# Patient Record
Sex: FEMALE | Race: NATIVE HAWAIIAN OR OTHER PACIFIC ISLANDER
[De-identification: names, ages, dates, MRNs, and addresses within clinical notes are randomized per-mention and may not be internally consistent; named-entity substitution may affect disease eponyms.]

---

## 2017-01-09 ENCOUNTER — HOSPITAL ENCOUNTER (OUTPATIENT)
Dept: HOSPITAL 67 - LAB | Age: 72
Discharge: HOME | End: 2017-01-09
Attending: INTERNAL MEDICINE
Payer: COMMERCIAL

## 2017-01-09 DIAGNOSIS — E11.9: ICD-10-CM

## 2017-01-09 DIAGNOSIS — D53.8: ICD-10-CM

## 2017-01-09 DIAGNOSIS — I10: ICD-10-CM

## 2017-01-09 DIAGNOSIS — E03.8: ICD-10-CM

## 2017-01-09 DIAGNOSIS — R82.99: ICD-10-CM

## 2017-01-09 DIAGNOSIS — Z00.01: Primary | ICD-10-CM

## 2017-01-09 LAB
LDLC SERPL-MCNC: 101 MG/DL (ref 0–?)
PLATELET # BLD: 364 K/UL (ref 152–353)
POTASSIUM SERPL-SCNC: 4.3 MMOL/L (ref 3.6–5.2)
SODIUM SERPL-SCNC: 137 MMOL/L (ref 136–145)

## 2017-01-09 PROCEDURE — 83540 ASSAY OF IRON: CPT

## 2017-01-09 PROCEDURE — 80061 LIPID PANEL: CPT

## 2017-01-09 PROCEDURE — 83036 HEMOGLOBIN GLYCOSYLATED A1C: CPT

## 2017-01-09 PROCEDURE — 84443 ASSAY THYROID STIM HORMONE: CPT

## 2017-01-09 PROCEDURE — 82728 ASSAY OF FERRITIN: CPT

## 2017-01-09 PROCEDURE — 87086 URINE CULTURE/COLONY COUNT: CPT

## 2017-01-09 PROCEDURE — 84550 ASSAY OF BLOOD/URIC ACID: CPT

## 2017-01-09 PROCEDURE — 87077 CULTURE AEROBIC IDENTIFY: CPT

## 2017-01-09 PROCEDURE — 87088 URINE BACTERIA CULTURE: CPT

## 2017-01-09 PROCEDURE — 85651 RBC SED RATE NONAUTOMATED: CPT

## 2017-01-09 PROCEDURE — 80053 COMPREHEN METABOLIC PANEL: CPT

## 2017-01-09 PROCEDURE — 85027 COMPLETE CBC AUTOMATED: CPT

## 2017-01-09 PROCEDURE — 83550 IRON BINDING TEST: CPT

## 2017-01-09 PROCEDURE — 81000 URINALYSIS NONAUTO W/SCOPE: CPT

## 2017-01-09 PROCEDURE — 87186 SC STD MICRODIL/AGAR DIL: CPT

## 2017-01-09 PROCEDURE — 85044 MANUAL RETICULOCYTE COUNT: CPT

## 2017-01-12 ENCOUNTER — HOSPITAL ENCOUNTER (OUTPATIENT)
Dept: HOSPITAL 67 - INF | Age: 72
Discharge: HOME | End: 2017-01-12
Attending: INTERNAL MEDICINE
Payer: COMMERCIAL

## 2017-01-12 VITALS — SYSTOLIC BLOOD PRESSURE: 186 MMHG | TEMPERATURE: 98.6 F | DIASTOLIC BLOOD PRESSURE: 83 MMHG

## 2017-01-12 VITALS — BODY MASS INDEX: 0.51 KG/M2 | WEIGHT: 2.2 LBS | HEIGHT: 55 IN

## 2017-01-12 VITALS — DIASTOLIC BLOOD PRESSURE: 78 MMHG | TEMPERATURE: 98.8 F | SYSTOLIC BLOOD PRESSURE: 185 MMHG

## 2017-01-12 DIAGNOSIS — D50.8: Primary | ICD-10-CM

## 2017-01-12 PROCEDURE — 96365 THER/PROPH/DIAG IV INF INIT: CPT

## 2017-03-07 ENCOUNTER — HOSPITAL ENCOUNTER (OUTPATIENT)
Dept: HOSPITAL 67 - LABW | Age: 72
Discharge: HOME | End: 2017-03-07
Payer: COMMERCIAL

## 2017-03-07 DIAGNOSIS — M79.671: ICD-10-CM

## 2017-03-07 DIAGNOSIS — E11.9: Primary | ICD-10-CM

## 2017-03-07 DIAGNOSIS — I10: ICD-10-CM

## 2017-03-07 LAB — PLATELET # BLD: 264 K/UL (ref 152–353)

## 2017-03-07 PROCEDURE — 85027 COMPLETE CBC AUTOMATED: CPT

## 2017-03-07 PROCEDURE — 85651 RBC SED RATE NONAUTOMATED: CPT

## 2017-03-07 PROCEDURE — 36415 COLL VENOUS BLD VENIPUNCTURE: CPT

## 2017-04-10 ENCOUNTER — HOSPITAL ENCOUNTER (OUTPATIENT)
Dept: HOSPITAL 67 - LAB | Age: 72
Discharge: HOME | End: 2017-04-10
Attending: INTERNAL MEDICINE
Payer: COMMERCIAL

## 2017-04-10 DIAGNOSIS — Z51.81: ICD-10-CM

## 2017-04-10 DIAGNOSIS — Z79.2: Primary | ICD-10-CM

## 2017-04-10 DIAGNOSIS — L03.115: ICD-10-CM

## 2017-04-10 LAB
PLATELET # BLD: 246 K/UL (ref 152–353)
POTASSIUM SERPL-SCNC: 4.1 MMOL/L (ref 3.6–5.2)
SODIUM SERPL-SCNC: 140 MMOL/L (ref 136–145)

## 2017-04-10 PROCEDURE — 85027 COMPLETE CBC AUTOMATED: CPT

## 2017-04-10 PROCEDURE — 86140 C-REACTIVE PROTEIN: CPT

## 2017-04-10 PROCEDURE — 80048 BASIC METABOLIC PNL TOTAL CA: CPT

## 2017-04-17 ENCOUNTER — HOSPITAL ENCOUNTER (OUTPATIENT)
Dept: HOSPITAL 67 - LAB | Age: 72
Discharge: HOME | End: 2017-04-17
Attending: INTERNAL MEDICINE
Payer: COMMERCIAL

## 2017-04-17 DIAGNOSIS — M86.171: Primary | ICD-10-CM

## 2017-04-17 DIAGNOSIS — Z45.2: ICD-10-CM

## 2017-04-17 DIAGNOSIS — Z79.2: ICD-10-CM

## 2017-04-17 DIAGNOSIS — L03.115: ICD-10-CM

## 2017-04-17 LAB
PLATELET # BLD: 262 K/UL (ref 152–353)
POTASSIUM SERPL-SCNC: 4.7 MMOL/L (ref 3.6–5.2)
SODIUM SERPL-SCNC: 138 MMOL/L (ref 136–145)

## 2017-04-17 PROCEDURE — 86140 C-REACTIVE PROTEIN: CPT

## 2017-04-17 PROCEDURE — 85027 COMPLETE CBC AUTOMATED: CPT

## 2017-04-17 PROCEDURE — 80048 BASIC METABOLIC PNL TOTAL CA: CPT

## 2017-04-24 ENCOUNTER — HOSPITAL ENCOUNTER (OUTPATIENT)
Dept: HOSPITAL 67 - LAB | Age: 72
Discharge: HOME | End: 2017-04-24
Attending: INTERNAL MEDICINE
Payer: COMMERCIAL

## 2017-04-24 DIAGNOSIS — M86.171: Primary | ICD-10-CM

## 2017-04-24 DIAGNOSIS — L03.115: ICD-10-CM

## 2017-04-24 LAB
PLATELET # BLD: 249 K/UL (ref 152–353)
POTASSIUM SERPL-SCNC: 4.1 MMOL/L (ref 3.6–5.2)
SODIUM SERPL-SCNC: 138 MMOL/L (ref 136–145)

## 2017-04-24 PROCEDURE — 85027 COMPLETE CBC AUTOMATED: CPT

## 2017-04-24 PROCEDURE — 80048 BASIC METABOLIC PNL TOTAL CA: CPT

## 2017-04-24 PROCEDURE — 86140 C-REACTIVE PROTEIN: CPT

## 2017-05-01 ENCOUNTER — HOSPITAL ENCOUNTER (OUTPATIENT)
Dept: HOSPITAL 67 - LAB | Age: 72
Discharge: HOME | End: 2017-05-01
Attending: INTERNAL MEDICINE
Payer: COMMERCIAL

## 2017-05-01 DIAGNOSIS — Z79.2: ICD-10-CM

## 2017-05-01 DIAGNOSIS — L03.115: Primary | ICD-10-CM

## 2017-05-01 DIAGNOSIS — Z51.81: ICD-10-CM

## 2017-05-01 LAB
PLATELET # BLD: 233 K/UL (ref 152–353)
POTASSIUM SERPL-SCNC: 3.9 MMOL/L (ref 3.6–5.2)
SODIUM SERPL-SCNC: 141 MMOL/L (ref 136–145)

## 2017-05-01 PROCEDURE — 85027 COMPLETE CBC AUTOMATED: CPT

## 2017-05-01 PROCEDURE — 80048 BASIC METABOLIC PNL TOTAL CA: CPT

## 2017-05-01 PROCEDURE — 86140 C-REACTIVE PROTEIN: CPT

## 2017-05-08 ENCOUNTER — HOSPITAL ENCOUNTER (OUTPATIENT)
Dept: HOSPITAL 67 - LAB | Age: 72
Discharge: HOME | End: 2017-05-08
Attending: INTERNAL MEDICINE
Payer: COMMERCIAL

## 2017-05-08 DIAGNOSIS — G57.91: ICD-10-CM

## 2017-05-08 DIAGNOSIS — Z45.2: ICD-10-CM

## 2017-05-08 DIAGNOSIS — E11.65: ICD-10-CM

## 2017-05-08 DIAGNOSIS — Z79.2: ICD-10-CM

## 2017-05-08 DIAGNOSIS — L03.115: ICD-10-CM

## 2017-05-08 DIAGNOSIS — M86.171: Primary | ICD-10-CM

## 2017-05-08 DIAGNOSIS — I10: ICD-10-CM

## 2017-05-08 LAB
PLATELET # BLD: 260 K/UL (ref 152–353)
POTASSIUM SERPL-SCNC: 4 MMOL/L (ref 3.6–5.2)
SODIUM SERPL-SCNC: 140 MMOL/L (ref 136–145)

## 2017-05-08 PROCEDURE — 86140 C-REACTIVE PROTEIN: CPT

## 2017-05-08 PROCEDURE — 80048 BASIC METABOLIC PNL TOTAL CA: CPT

## 2017-05-08 PROCEDURE — 85027 COMPLETE CBC AUTOMATED: CPT

## 2017-06-15 ENCOUNTER — HOSPITAL ENCOUNTER (OUTPATIENT)
Dept: HOSPITAL 67 - LAB | Age: 72
Discharge: HOME | End: 2017-06-15
Attending: INTERNAL MEDICINE
Payer: COMMERCIAL

## 2017-06-15 DIAGNOSIS — M86.171: Primary | ICD-10-CM

## 2017-06-15 LAB
PLATELET # BLD: 248 K/UL (ref 152–353)
POTASSIUM SERPL-SCNC: 4.4 MMOL/L (ref 3.6–5.2)
SODIUM SERPL-SCNC: 141 MMOL/L (ref 136–145)

## 2017-06-15 PROCEDURE — 86140 C-REACTIVE PROTEIN: CPT

## 2017-06-15 PROCEDURE — 80048 BASIC METABOLIC PNL TOTAL CA: CPT

## 2017-06-15 PROCEDURE — 85027 COMPLETE CBC AUTOMATED: CPT

## 2017-10-23 ENCOUNTER — HOSPITAL ENCOUNTER (OUTPATIENT)
Dept: HOSPITAL 67 - LAB | Age: 72
Discharge: HOME | End: 2017-10-23
Attending: NURSE PRACTITIONER
Payer: COMMERCIAL

## 2017-10-23 DIAGNOSIS — I10: ICD-10-CM

## 2017-10-23 DIAGNOSIS — E78.4: ICD-10-CM

## 2017-10-23 DIAGNOSIS — D63.8: ICD-10-CM

## 2017-10-23 DIAGNOSIS — E11.65: Primary | ICD-10-CM

## 2017-10-23 DIAGNOSIS — E55.9: ICD-10-CM

## 2017-10-23 LAB
LDLC SERPL-MCNC: 108 MG/DL (ref 0–?)
PLATELET # BLD: 235 K/UL (ref 152–353)
POTASSIUM SERPL-SCNC: 5.1 MMOL/L (ref 3.6–5.2)
SODIUM SERPL-SCNC: 139 MMOL/L (ref 136–145)

## 2017-10-23 PROCEDURE — 82306 VITAMIN D 25 HYDROXY: CPT

## 2017-10-23 PROCEDURE — 85027 COMPLETE CBC AUTOMATED: CPT

## 2017-10-23 PROCEDURE — 82570 ASSAY OF URINE CREATININE: CPT

## 2017-10-23 PROCEDURE — 80061 LIPID PANEL: CPT

## 2017-10-23 PROCEDURE — 83036 HEMOGLOBIN GLYCOSYLATED A1C: CPT

## 2017-10-23 PROCEDURE — 82043 UR ALBUMIN QUANTITATIVE: CPT

## 2017-10-23 PROCEDURE — 80053 COMPREHEN METABOLIC PANEL: CPT

## 2017-10-23 PROCEDURE — 86039 ANTINUCLEAR ANTIBODIES (ANA): CPT

## 2017-10-23 PROCEDURE — 85651 RBC SED RATE NONAUTOMATED: CPT

## 2018-05-01 ENCOUNTER — HOSPITAL ENCOUNTER (OUTPATIENT)
Dept: HOSPITAL 67 - LABW | Age: 73
Discharge: HOME | End: 2018-05-01
Attending: NURSE PRACTITIONER
Payer: COMMERCIAL

## 2018-05-01 DIAGNOSIS — E11.65: ICD-10-CM

## 2018-05-01 DIAGNOSIS — D63.8: ICD-10-CM

## 2018-05-01 DIAGNOSIS — I10: ICD-10-CM

## 2018-05-01 DIAGNOSIS — E03.8: ICD-10-CM

## 2018-05-01 DIAGNOSIS — E78.4: ICD-10-CM

## 2018-05-01 DIAGNOSIS — R60.0: Primary | ICD-10-CM

## 2018-05-01 DIAGNOSIS — E55.9: ICD-10-CM

## 2018-05-01 PROCEDURE — 83036 HEMOGLOBIN GLYCOSYLATED A1C: CPT

## 2018-05-01 PROCEDURE — 80053 COMPREHEN METABOLIC PANEL: CPT

## 2018-05-01 PROCEDURE — 82043 UR ALBUMIN QUANTITATIVE: CPT

## 2018-05-01 PROCEDURE — 83735 ASSAY OF MAGNESIUM: CPT

## 2018-05-01 PROCEDURE — 82570 ASSAY OF URINE CREATININE: CPT

## 2018-05-01 PROCEDURE — 80061 LIPID PANEL: CPT

## 2018-05-01 PROCEDURE — 84443 ASSAY THYROID STIM HORMONE: CPT

## 2018-05-01 PROCEDURE — 85027 COMPLETE CBC AUTOMATED: CPT

## 2018-05-01 PROCEDURE — 82306 VITAMIN D 25 HYDROXY: CPT

## 2018-05-02 LAB
LDLC SERPL-MCNC: 74 MG/DL (ref 0–?)
PLATELET # BLD: 281 K/UL (ref 152–353)
POTASSIUM SERPL-SCNC: 5.4 MMOL/L (ref 3.6–5.2)
SODIUM SERPL-SCNC: 137 MMOL/L (ref 136–145)

## 2018-08-14 ENCOUNTER — HOSPITAL ENCOUNTER (OUTPATIENT)
Dept: HOSPITAL 67 - LAB | Age: 73
Discharge: HOME | End: 2018-08-14
Payer: COMMERCIAL

## 2018-08-14 DIAGNOSIS — M05.50: ICD-10-CM

## 2018-08-14 DIAGNOSIS — E78.4: ICD-10-CM

## 2018-08-14 DIAGNOSIS — E11.9: ICD-10-CM

## 2018-08-14 DIAGNOSIS — I10: Primary | ICD-10-CM

## 2018-08-14 LAB
LDLC SERPL-MCNC: 112 MG/DL (ref 0–?)
PLATELET # BLD: 234 K/UL (ref 152–353)
POTASSIUM SERPL-SCNC: 5.6 MMOL/L (ref 3.6–5.2)
SODIUM SERPL-SCNC: 141 MMOL/L (ref 136–145)

## 2018-08-14 PROCEDURE — 83735 ASSAY OF MAGNESIUM: CPT

## 2018-08-14 PROCEDURE — 85027 COMPLETE CBC AUTOMATED: CPT

## 2018-08-14 PROCEDURE — 82306 VITAMIN D 25 HYDROXY: CPT

## 2018-08-14 PROCEDURE — 82043 UR ALBUMIN QUANTITATIVE: CPT

## 2018-08-14 PROCEDURE — 83036 HEMOGLOBIN GLYCOSYLATED A1C: CPT

## 2018-08-14 PROCEDURE — 82570 ASSAY OF URINE CREATININE: CPT

## 2018-08-14 PROCEDURE — 80053 COMPREHEN METABOLIC PANEL: CPT

## 2018-08-14 PROCEDURE — 80061 LIPID PANEL: CPT

## 2018-08-14 PROCEDURE — 84443 ASSAY THYROID STIM HORMONE: CPT

## 2018-10-01 ENCOUNTER — HOSPITAL ENCOUNTER (OUTPATIENT)
Dept: HOSPITAL 67 - US | Age: 73
Discharge: HOME | End: 2018-10-01
Payer: COMMERCIAL

## 2018-10-01 DIAGNOSIS — M05.131: Primary | ICD-10-CM

## 2018-12-04 ENCOUNTER — HOSPITAL ENCOUNTER (OUTPATIENT)
Dept: HOSPITAL 67 - US | Age: 73
Discharge: HOME | End: 2018-12-04
Payer: COMMERCIAL

## 2018-12-04 DIAGNOSIS — R94.4: Primary | ICD-10-CM

## 2019-01-08 ENCOUNTER — HOSPITAL ENCOUNTER (OUTPATIENT)
Dept: HOSPITAL 67 - LABW | Age: 74
Discharge: HOME | End: 2019-01-08
Payer: COMMERCIAL

## 2019-01-08 DIAGNOSIS — E03.9: Primary | ICD-10-CM

## 2019-01-08 DIAGNOSIS — R94.4: ICD-10-CM

## 2019-01-08 DIAGNOSIS — E55.9: ICD-10-CM

## 2019-01-08 LAB
LDLC SERPL-MCNC: 74 MG/DL (ref 0–?)
PLATELET # BLD: 202 K/UL (ref 152–353)
POTASSIUM SERPL-SCNC: 4.3 MMOL/L (ref 3.6–5.2)
SODIUM SERPL-SCNC: 140 MMOL/L (ref 136–145)

## 2019-01-08 PROCEDURE — 80053 COMPREHEN METABOLIC PANEL: CPT

## 2019-01-08 PROCEDURE — 85027 COMPLETE CBC AUTOMATED: CPT

## 2019-01-08 PROCEDURE — 82570 ASSAY OF URINE CREATININE: CPT

## 2019-01-08 PROCEDURE — 84443 ASSAY THYROID STIM HORMONE: CPT

## 2019-01-08 PROCEDURE — 83036 HEMOGLOBIN GLYCOSYLATED A1C: CPT

## 2019-01-08 PROCEDURE — 82043 UR ALBUMIN QUANTITATIVE: CPT

## 2019-01-08 PROCEDURE — 80061 LIPID PANEL: CPT

## 2019-01-08 PROCEDURE — 82306 VITAMIN D 25 HYDROXY: CPT

## 2019-06-09 ENCOUNTER — HOSPITAL ENCOUNTER (OUTPATIENT)
Dept: HOSPITAL 67 - LABW | Age: 74
Discharge: HOME | End: 2019-06-09
Payer: COMMERCIAL

## 2019-06-09 DIAGNOSIS — E03.8: ICD-10-CM

## 2019-06-09 DIAGNOSIS — I10: Primary | ICD-10-CM

## 2019-06-09 DIAGNOSIS — E11.65: ICD-10-CM

## 2019-06-09 DIAGNOSIS — R60.0: ICD-10-CM

## 2019-06-09 DIAGNOSIS — E78.2: ICD-10-CM

## 2019-06-09 LAB
PLATELET # BLD: 256 K/UL (ref 152–353)
POTASSIUM SERPL-SCNC: 5.1 MMOL/L (ref 3.6–5.2)
SODIUM SERPL-SCNC: 142 MMOL/L (ref 136–145)

## 2019-06-09 PROCEDURE — 86376 MICROSOMAL ANTIBODY EACH: CPT

## 2019-06-09 PROCEDURE — 84481 FREE ASSAY (FT-3): CPT

## 2019-06-09 PROCEDURE — 84439 ASSAY OF FREE THYROXINE: CPT

## 2019-06-09 PROCEDURE — 83036 HEMOGLOBIN GLYCOSYLATED A1C: CPT

## 2019-06-09 PROCEDURE — 82570 ASSAY OF URINE CREATININE: CPT

## 2019-06-09 PROCEDURE — 80053 COMPREHEN METABOLIC PANEL: CPT

## 2019-06-09 PROCEDURE — 82043 UR ALBUMIN QUANTITATIVE: CPT

## 2019-06-09 PROCEDURE — 85027 COMPLETE CBC AUTOMATED: CPT

## 2020-06-30 ENCOUNTER — HOSPITAL ENCOUNTER (OUTPATIENT)
Dept: HOSPITAL 67 - LAB | Age: 75
Discharge: HOME | End: 2020-06-30
Attending: FAMILY MEDICINE
Payer: COMMERCIAL

## 2020-06-30 DIAGNOSIS — R60.0: ICD-10-CM

## 2020-06-30 DIAGNOSIS — I10: Primary | ICD-10-CM

## 2020-06-30 DIAGNOSIS — E11.65: ICD-10-CM

## 2020-06-30 DIAGNOSIS — E78.2: ICD-10-CM

## 2020-06-30 LAB
LDLC SERPL-MCNC: 79 MG/DL (ref 0–?)
PLATELET # BLD: 188 K/UL (ref 152–353)
POTASSIUM SERPL-SCNC: 5.2 MMOL/L (ref 3.6–5.2)
SODIUM SERPL-SCNC: 145 MMOL/L (ref 136–145)

## 2020-06-30 PROCEDURE — 84436 ASSAY OF TOTAL THYROXINE: CPT

## 2020-06-30 PROCEDURE — 82043 UR ALBUMIN QUANTITATIVE: CPT

## 2020-06-30 PROCEDURE — 80061 LIPID PANEL: CPT

## 2020-06-30 PROCEDURE — 82570 ASSAY OF URINE CREATININE: CPT

## 2020-06-30 PROCEDURE — 83036 HEMOGLOBIN GLYCOSYLATED A1C: CPT

## 2020-06-30 PROCEDURE — 82306 VITAMIN D 25 HYDROXY: CPT

## 2020-06-30 PROCEDURE — 83735 ASSAY OF MAGNESIUM: CPT

## 2020-06-30 PROCEDURE — 80053 COMPREHEN METABOLIC PANEL: CPT

## 2020-06-30 PROCEDURE — 85027 COMPLETE CBC AUTOMATED: CPT

## 2020-06-30 PROCEDURE — 84479 ASSAY OF THYROID (T3 OR T4): CPT

## 2020-06-30 PROCEDURE — 84443 ASSAY THYROID STIM HORMONE: CPT

## 2020-07-08 ENCOUNTER — HOSPITAL ENCOUNTER (OUTPATIENT)
Dept: HOSPITAL 67 - INF | Age: 75
LOS: 2 days | Discharge: HOME | End: 2020-07-10
Attending: INTERNAL MEDICINE
Payer: COMMERCIAL

## 2020-07-08 VITALS — TEMPERATURE: 98.9 F | SYSTOLIC BLOOD PRESSURE: 151 MMHG | DIASTOLIC BLOOD PRESSURE: 63 MMHG

## 2020-07-08 VITALS — WEIGHT: 188 LBS | BODY MASS INDEX: 36.91 KG/M2 | HEIGHT: 60 IN

## 2020-07-08 DIAGNOSIS — I10: ICD-10-CM

## 2020-07-08 DIAGNOSIS — N19: ICD-10-CM

## 2020-07-08 DIAGNOSIS — D64.9: Primary | ICD-10-CM

## 2020-07-08 PROCEDURE — 86850 RBC ANTIBODY SCREEN: CPT

## 2020-07-08 PROCEDURE — P9016 RBC LEUKOCYTES REDUCED: HCPCS

## 2020-07-08 PROCEDURE — 85018 HEMOGLOBIN: CPT

## 2020-07-08 PROCEDURE — 86901 BLOOD TYPING SEROLOGIC RH(D): CPT

## 2020-07-08 PROCEDURE — 86922 COMPATIBILITY TEST ANTIGLOB: CPT

## 2020-07-08 PROCEDURE — 85014 HEMATOCRIT: CPT

## 2020-07-08 PROCEDURE — 36430 TRANSFUSION BLD/BLD COMPNT: CPT

## 2020-07-08 PROCEDURE — 36591 DRAW BLOOD OFF VENOUS DEVICE: CPT

## 2020-07-08 PROCEDURE — 36415 COLL VENOUS BLD VENIPUNCTURE: CPT

## 2020-07-08 PROCEDURE — 86900 BLOOD TYPING SEROLOGIC ABO: CPT

## 2020-07-08 PROCEDURE — 93005 ELECTROCARDIOGRAM TRACING: CPT

## 2021-03-08 ENCOUNTER — HOSPITAL ENCOUNTER (OUTPATIENT)
Dept: HOSPITAL 67 - LAB | Age: 76
End: 2021-03-08
Attending: FAMILY MEDICINE
Payer: COMMERCIAL

## 2021-03-08 DIAGNOSIS — K21.9: ICD-10-CM

## 2021-03-08 DIAGNOSIS — E03.8: ICD-10-CM

## 2021-03-08 DIAGNOSIS — I10: Primary | ICD-10-CM

## 2021-03-08 DIAGNOSIS — E11.65: ICD-10-CM

## 2021-03-08 DIAGNOSIS — M05.131: ICD-10-CM

## 2021-03-08 DIAGNOSIS — G31.84: ICD-10-CM

## 2021-03-08 DIAGNOSIS — E55.9: ICD-10-CM

## 2021-03-08 DIAGNOSIS — E78.2: ICD-10-CM

## 2021-03-08 DIAGNOSIS — D64.89: ICD-10-CM

## 2021-03-08 DIAGNOSIS — M32.10: ICD-10-CM

## 2021-03-08 DIAGNOSIS — K92.1: ICD-10-CM

## 2021-03-08 LAB
LDLC SERPL-MCNC: 41 MG/DL (ref 0–?)
PLATELET # BLD: 156 K/UL (ref 152–353)
POTASSIUM SERPL-SCNC: 4.6 MMOL/L (ref 3.6–5.2)
SODIUM SERPL-SCNC: 138 MMOL/L (ref 136–145)

## 2021-03-08 PROCEDURE — 82272 OCCULT BLD FECES 1-3 TESTS: CPT

## 2021-03-08 PROCEDURE — 83036 HEMOGLOBIN GLYCOSYLATED A1C: CPT

## 2021-03-08 PROCEDURE — 85007 BL SMEAR W/DIFF WBC COUNT: CPT

## 2021-03-08 PROCEDURE — 85027 COMPLETE CBC AUTOMATED: CPT

## 2021-03-08 PROCEDURE — 80053 COMPREHEN METABOLIC PANEL: CPT

## 2021-03-08 PROCEDURE — 85652 RBC SED RATE AUTOMATED: CPT

## 2021-03-08 PROCEDURE — 80061 LIPID PANEL: CPT

## 2021-03-08 PROCEDURE — 82306 VITAMIN D 25 HYDROXY: CPT

## 2021-03-08 PROCEDURE — 83735 ASSAY OF MAGNESIUM: CPT

## 2021-03-08 PROCEDURE — 84443 ASSAY THYROID STIM HORMONE: CPT

## 2021-03-10 ENCOUNTER — HOSPITAL ENCOUNTER (OUTPATIENT)
Dept: HOSPITAL 67 - MED/SURG | Age: 76
Setting detail: OBSERVATION
LOS: 1 days | Discharge: HOME | End: 2021-03-11
Attending: INTERNAL MEDICINE | Admitting: INTERNAL MEDICINE
Payer: COMMERCIAL

## 2021-03-10 VITALS — DIASTOLIC BLOOD PRESSURE: 57 MMHG | TEMPERATURE: 99 F | SYSTOLIC BLOOD PRESSURE: 141 MMHG

## 2021-03-10 VITALS — DIASTOLIC BLOOD PRESSURE: 54 MMHG | SYSTOLIC BLOOD PRESSURE: 140 MMHG | TEMPERATURE: 98.3 F

## 2021-03-10 VITALS
WEIGHT: 188.31 LBS | HEIGHT: 63 IN | WEIGHT: 188.31 LBS | BODY MASS INDEX: 33.37 KG/M2 | BODY MASS INDEX: 33.37 KG/M2 | HEIGHT: 63 IN

## 2021-03-10 VITALS — SYSTOLIC BLOOD PRESSURE: 137 MMHG | TEMPERATURE: 98.2 F | DIASTOLIC BLOOD PRESSURE: 49 MMHG

## 2021-03-10 VITALS — DIASTOLIC BLOOD PRESSURE: 50 MMHG | SYSTOLIC BLOOD PRESSURE: 138 MMHG | TEMPERATURE: 98.4 F

## 2021-03-10 VITALS — TEMPERATURE: 97.7 F | DIASTOLIC BLOOD PRESSURE: 47 MMHG | SYSTOLIC BLOOD PRESSURE: 144 MMHG

## 2021-03-10 VITALS — TEMPERATURE: 98.2 F | DIASTOLIC BLOOD PRESSURE: 67 MMHG | SYSTOLIC BLOOD PRESSURE: 145 MMHG

## 2021-03-10 VITALS — DIASTOLIC BLOOD PRESSURE: 60 MMHG | SYSTOLIC BLOOD PRESSURE: 128 MMHG | TEMPERATURE: 98.7 F

## 2021-03-10 VITALS — DIASTOLIC BLOOD PRESSURE: 45 MMHG | TEMPERATURE: 98.1 F | SYSTOLIC BLOOD PRESSURE: 142 MMHG

## 2021-03-10 VITALS — SYSTOLIC BLOOD PRESSURE: 132 MMHG | TEMPERATURE: 98.9 F | DIASTOLIC BLOOD PRESSURE: 62 MMHG

## 2021-03-10 VITALS — DIASTOLIC BLOOD PRESSURE: 65 MMHG | TEMPERATURE: 98.4 F | SYSTOLIC BLOOD PRESSURE: 136 MMHG

## 2021-03-10 VITALS — TEMPERATURE: 98.6 F | SYSTOLIC BLOOD PRESSURE: 145 MMHG | DIASTOLIC BLOOD PRESSURE: 56 MMHG

## 2021-03-10 VITALS — TEMPERATURE: 98.2 F | DIASTOLIC BLOOD PRESSURE: 49 MMHG | SYSTOLIC BLOOD PRESSURE: 137 MMHG

## 2021-03-10 DIAGNOSIS — K21.9: ICD-10-CM

## 2021-03-10 DIAGNOSIS — E03.8: ICD-10-CM

## 2021-03-10 DIAGNOSIS — D50.8: Primary | ICD-10-CM

## 2021-03-10 DIAGNOSIS — M32.8: ICD-10-CM

## 2021-03-10 DIAGNOSIS — I10: ICD-10-CM

## 2021-03-10 DIAGNOSIS — F03.90: ICD-10-CM

## 2021-03-10 DIAGNOSIS — E78.49: ICD-10-CM

## 2021-03-10 DIAGNOSIS — E11.42: ICD-10-CM

## 2021-03-10 LAB
PLATELET # BLD: 192 K/UL (ref 152–353)
POTASSIUM SERPL-SCNC: 4.1 MMOL/L (ref 3.6–5.2)
SODIUM SERPL-SCNC: 138 MMOL/L (ref 136–145)

## 2021-03-10 PROCEDURE — 86901 BLOOD TYPING SEROLOGIC RH(D): CPT

## 2021-03-10 PROCEDURE — 85007 BL SMEAR W/DIFF WBC COUNT: CPT

## 2021-03-10 PROCEDURE — G0379 DIRECT REFER HOSPITAL OBSERV: HCPCS

## 2021-03-10 PROCEDURE — 85014 HEMATOCRIT: CPT

## 2021-03-10 PROCEDURE — 86900 BLOOD TYPING SEROLOGIC ABO: CPT

## 2021-03-10 PROCEDURE — 80053 COMPREHEN METABOLIC PANEL: CPT

## 2021-03-10 PROCEDURE — 85008 BL SMEAR W/O DIFF WBC COUNT: CPT

## 2021-03-10 PROCEDURE — U0003 INFECTIOUS AGENT DETECTION BY NUCLEIC ACID (DNA OR RNA); SEVERE ACUTE RESPIRATORY SYNDROME CORONAVIRUS 2 (SARS-COV-2) (CORONAVIRUS DISEASE [COVID-19]), AMPLIFIED PROBE TECHNIQUE, MAKING USE OF HIGH THROUGHPUT TECHNOLOGIES AS DESCRIBED BY CMS-2020-01-R: HCPCS

## 2021-03-10 PROCEDURE — 99220: CPT

## 2021-03-10 PROCEDURE — 30233N1 TRANSFUSION OF NONAUTOLOGOUS RED BLOOD CELLS INTO PERIPHERAL VEIN, PERCUTANEOUS APPROACH: ICD-10-PCS | Performed by: INTERNAL MEDICINE

## 2021-03-10 PROCEDURE — 85018 HEMOGLOBIN: CPT

## 2021-03-10 PROCEDURE — 86850 RBC ANTIBODY SCREEN: CPT

## 2021-03-10 PROCEDURE — 85027 COMPLETE CBC AUTOMATED: CPT

## 2021-03-10 PROCEDURE — P9016 RBC LEUKOCYTES REDUCED: HCPCS

## 2021-03-10 PROCEDURE — 36415 COLL VENOUS BLD VENIPUNCTURE: CPT

## 2021-03-10 PROCEDURE — 86922 COMPATIBILITY TEST ANTIGLOB: CPT

## 2021-03-10 PROCEDURE — 36430 TRANSFUSION BLD/BLD COMPNT: CPT

## 2021-03-10 PROCEDURE — G0378 HOSPITAL OBSERVATION PER HR: HCPCS

## 2021-03-10 PROCEDURE — 87635 SARS-COV-2 COVID-19 AMP PRB: CPT

## 2021-03-11 VITALS — SYSTOLIC BLOOD PRESSURE: 121 MMHG | TEMPERATURE: 98.6 F | DIASTOLIC BLOOD PRESSURE: 60 MMHG

## 2021-03-11 VITALS — DIASTOLIC BLOOD PRESSURE: 53 MMHG | SYSTOLIC BLOOD PRESSURE: 121 MMHG | TEMPERATURE: 98.1 F

## 2021-03-11 VITALS — DIASTOLIC BLOOD PRESSURE: 72 MMHG | SYSTOLIC BLOOD PRESSURE: 122 MMHG | TEMPERATURE: 98.2 F

## 2021-03-11 VITALS — TEMPERATURE: 98.5 F | SYSTOLIC BLOOD PRESSURE: 93 MMHG | DIASTOLIC BLOOD PRESSURE: 43 MMHG

## 2021-03-11 NOTE — NUR
PT THIS AM STATES "I FEEL MUCH BETTER I FEEL LIKE I COULD GET UP AND DANCE,"
NAD NOTED, DAUGHTER AT BEDSIDE, NO NEEDS AT THIS TIME, WILL CONTINUE TO
MONITOR, CALL LIGHT WITHIN REACH

## 2021-03-11 NOTE — NUR
PT D/C INSTRUCTIONS GIVEN AND EXPLAINED TO PT AND DAUGHTER, BOTH VERBALIZED
UNDERSTANDING, FOLLOWUP APPT MADE WITH ERINN QUINONES ON MARCH 18,2021, IV
REMOVED FROM LT WRIST WITH CATHETER INTACT, NO FURTHER NEEDS AT THIS TIME

## 2021-04-13 ENCOUNTER — HOSPITAL ENCOUNTER (OUTPATIENT)
Dept: HOSPITAL 67 - LABW | Age: 76
End: 2021-04-13
Attending: NURSE PRACTITIONER
Payer: COMMERCIAL

## 2021-04-13 DIAGNOSIS — M25.512: ICD-10-CM

## 2021-04-13 DIAGNOSIS — E11.9: ICD-10-CM

## 2021-04-13 DIAGNOSIS — I10: ICD-10-CM

## 2021-04-13 DIAGNOSIS — M25.522: ICD-10-CM

## 2021-04-13 DIAGNOSIS — R53.81: ICD-10-CM

## 2021-04-13 DIAGNOSIS — D64.89: Primary | ICD-10-CM

## 2021-04-13 DIAGNOSIS — R53.83: ICD-10-CM

## 2021-04-13 DIAGNOSIS — Z79.899: ICD-10-CM

## 2021-04-13 DIAGNOSIS — M25.521: ICD-10-CM

## 2021-04-13 DIAGNOSIS — M25.511: ICD-10-CM

## 2021-04-13 DIAGNOSIS — M25.552: ICD-10-CM

## 2021-04-13 DIAGNOSIS — E78.49: ICD-10-CM

## 2021-04-13 DIAGNOSIS — M25.551: ICD-10-CM

## 2021-04-13 DIAGNOSIS — M19.90: ICD-10-CM

## 2021-04-13 LAB
LDLC SERPL-MCNC: 75 MG/DL (ref 0–?)
PLATELET # BLD: 260 K/UL (ref 152–353)
POTASSIUM SERPL-SCNC: 4.9 MMOL/L (ref 3.6–5.2)
SODIUM SERPL-SCNC: 138 MMOL/L (ref 136–145)

## 2021-04-13 PROCEDURE — 80061 LIPID PANEL: CPT

## 2021-04-13 PROCEDURE — 82306 VITAMIN D 25 HYDROXY: CPT

## 2021-04-13 PROCEDURE — 85027 COMPLETE CBC AUTOMATED: CPT

## 2021-04-13 PROCEDURE — 83036 HEMOGLOBIN GLYCOSYLATED A1C: CPT

## 2021-04-13 PROCEDURE — 84443 ASSAY THYROID STIM HORMONE: CPT

## 2021-04-13 PROCEDURE — 85008 BL SMEAR W/O DIFF WBC COUNT: CPT

## 2021-04-13 PROCEDURE — 84439 ASSAY OF FREE THYROXINE: CPT

## 2021-04-13 PROCEDURE — 80053 COMPREHEN METABOLIC PANEL: CPT

## 2021-07-21 ENCOUNTER — HOSPITAL ENCOUNTER (OUTPATIENT)
Dept: HOSPITAL 67 - MED/SURG | Age: 76
Setting detail: OBSERVATION
LOS: 1 days | Discharge: HOME | End: 2021-07-22
Attending: INTERNAL MEDICINE | Admitting: INTERNAL MEDICINE
Payer: COMMERCIAL

## 2021-07-21 VITALS — SYSTOLIC BLOOD PRESSURE: 96 MMHG | DIASTOLIC BLOOD PRESSURE: 65 MMHG | TEMPERATURE: 98.1 F

## 2021-07-21 VITALS — TEMPERATURE: 98.3 F | DIASTOLIC BLOOD PRESSURE: 74 MMHG | SYSTOLIC BLOOD PRESSURE: 106 MMHG

## 2021-07-21 VITALS — SYSTOLIC BLOOD PRESSURE: 153 MMHG | TEMPERATURE: 98.1 F | DIASTOLIC BLOOD PRESSURE: 66 MMHG

## 2021-07-21 VITALS
WEIGHT: 183.25 LBS | BODY MASS INDEX: 31.28 KG/M2 | WEIGHT: 183.25 LBS | HEIGHT: 64 IN | HEIGHT: 64 IN | BODY MASS INDEX: 31.28 KG/M2

## 2021-07-21 DIAGNOSIS — E78.49: ICD-10-CM

## 2021-07-21 DIAGNOSIS — F32.89: ICD-10-CM

## 2021-07-21 DIAGNOSIS — F03.90: ICD-10-CM

## 2021-07-21 DIAGNOSIS — E11.9: ICD-10-CM

## 2021-07-21 DIAGNOSIS — I10: ICD-10-CM

## 2021-07-21 DIAGNOSIS — M32.8: ICD-10-CM

## 2021-07-21 DIAGNOSIS — M06.8A: ICD-10-CM

## 2021-07-21 DIAGNOSIS — D64.89: Primary | ICD-10-CM

## 2021-07-21 DIAGNOSIS — E03.8: ICD-10-CM

## 2021-07-21 PROCEDURE — 83540 ASSAY OF IRON: CPT

## 2021-07-21 PROCEDURE — 99220: CPT

## 2021-07-21 PROCEDURE — 96374 THER/PROPH/DIAG INJ IV PUSH: CPT

## 2021-07-21 PROCEDURE — 96372 THER/PROPH/DIAG INJ SC/IM: CPT

## 2021-07-21 PROCEDURE — 86900 BLOOD TYPING SEROLOGIC ABO: CPT

## 2021-07-21 PROCEDURE — 83550 IRON BINDING TEST: CPT

## 2021-07-21 PROCEDURE — 82948 REAGENT STRIP/BLOOD GLUCOSE: CPT

## 2021-07-21 PROCEDURE — G0378 HOSPITAL OBSERVATION PER HR: HCPCS

## 2021-07-21 PROCEDURE — 86901 BLOOD TYPING SEROLOGIC RH(D): CPT

## 2021-07-21 PROCEDURE — 85018 HEMOGLOBIN: CPT

## 2021-07-21 PROCEDURE — 36591 DRAW BLOOD OFF VENOUS DEVICE: CPT

## 2021-07-21 PROCEDURE — U0003 INFECTIOUS AGENT DETECTION BY NUCLEIC ACID (DNA OR RNA); SEVERE ACUTE RESPIRATORY SYNDROME CORONAVIRUS 2 (SARS-COV-2) (CORONAVIRUS DISEASE [COVID-19]), AMPLIFIED PROBE TECHNIQUE, MAKING USE OF HIGH THROUGHPUT TECHNOLOGIES AS DESCRIBED BY CMS-2020-01-R: HCPCS

## 2021-07-21 PROCEDURE — P9016 RBC LEUKOCYTES REDUCED: HCPCS

## 2021-07-21 PROCEDURE — 82728 ASSAY OF FERRITIN: CPT

## 2021-07-21 PROCEDURE — 80048 BASIC METABOLIC PNL TOTAL CA: CPT

## 2021-07-21 PROCEDURE — 82607 VITAMIN B-12: CPT

## 2021-07-21 PROCEDURE — 86850 RBC ANTIBODY SCREEN: CPT

## 2021-07-21 PROCEDURE — 86922 COMPATIBILITY TEST ANTIGLOB: CPT

## 2021-07-21 PROCEDURE — 85027 COMPLETE CBC AUTOMATED: CPT

## 2021-07-21 PROCEDURE — 87635 SARS-COV-2 COVID-19 AMP PRB: CPT

## 2021-07-21 PROCEDURE — 85014 HEMATOCRIT: CPT

## 2021-07-21 PROCEDURE — 30233N1 TRANSFUSION OF NONAUTOLOGOUS RED BLOOD CELLS INTO PERIPHERAL VEIN, PERCUTANEOUS APPROACH: ICD-10-PCS | Performed by: INTERNAL MEDICINE

## 2021-07-21 PROCEDURE — 82746 ASSAY OF FOLIC ACID SERUM: CPT

## 2021-07-21 PROCEDURE — G0379 DIRECT REFER HOSPITAL OBSERV: HCPCS

## 2021-07-21 PROCEDURE — 36430 TRANSFUSION BLD/BLD COMPNT: CPT

## 2021-07-22 VITALS — TEMPERATURE: 98.4 F | SYSTOLIC BLOOD PRESSURE: 145 MMHG | DIASTOLIC BLOOD PRESSURE: 56 MMHG

## 2021-07-22 VITALS — SYSTOLIC BLOOD PRESSURE: 152 MMHG | TEMPERATURE: 98 F | DIASTOLIC BLOOD PRESSURE: 74 MMHG

## 2021-07-22 VITALS — TEMPERATURE: 97.6 F | DIASTOLIC BLOOD PRESSURE: 68 MMHG | SYSTOLIC BLOOD PRESSURE: 147 MMHG

## 2021-07-22 VITALS — TEMPERATURE: 98.5 F | DIASTOLIC BLOOD PRESSURE: 88 MMHG | SYSTOLIC BLOOD PRESSURE: 130 MMHG

## 2021-07-22 LAB
PLATELET # BLD: 105 K/UL (ref 152–353)
POTASSIUM SERPL-SCNC: 4.5 MMOL/L (ref 3.6–5.2)
SODIUM SERPL-SCNC: 140 MMOL/L (ref 136–145)

## 2021-07-22 PROCEDURE — 30233N1 TRANSFUSION OF NONAUTOLOGOUS RED BLOOD CELLS INTO PERIPHERAL VEIN, PERCUTANEOUS APPROACH: ICD-10-PCS | Performed by: INTERNAL MEDICINE

## 2021-07-22 NOTE — NUR
PATIENT'S NEW HOME MEDICATIONS AS FOLLOWS:
FOLIC ACID TAKE 1 TABLET EVERY MORNING
VITAMIN B12 1 MG INTERMUSCULAR INJECTION EVERY MORNING

## 2021-07-22 NOTE — NUR
PATIENT D/C'D FROM FACILITY AND TAKEN TO POV VIA WHEELCHAIR WITH DAUGHTER AT
SIDE. NAD NOTED WITH PATIENT AT THIS TIME.

## 2021-07-22 NOTE — NUR
PATIENT HAS A WOUND TO HER ANKLE, PATIENT HAS BEEN TREATING AT HOME WITH
"NEOSPORON AND KYUNG."
PATIENT DENIES PAIN BUT SAYS THERE IS A SMALL AMOUNT OF TENDERNESSS IN THE
MIDDLE. THE MIDDLE OF THE WOUND HAS A MEDIUM SIZE SLOUGH. DRESSING WAS CHANGED
AND WOUND WILL BE REPORTED TO DE

## 2021-07-22 NOTE — NUR
REVIEWED DISCHARGE INSTRUCTIONS WITH PATIENT AND PATIENT'S DAUGHTER, BOTH V/O
UNDERSTANDING AND DENIES ANY FURTHER QUESTIONS OR C/O AT THIS TIME. IV D/C'D
WITH TIP IN TACT. PATIENT TOLERATED WELL.

## 2021-08-01 ENCOUNTER — HOSPITAL ENCOUNTER (OUTPATIENT)
Dept: HOSPITAL 67 - LAB | Age: 76
End: 2021-08-01
Attending: NURSE PRACTITIONER
Payer: COMMERCIAL

## 2021-08-01 DIAGNOSIS — D64.89: Primary | ICD-10-CM

## 2021-08-01 LAB — PLATELET # BLD: 361 K/UL (ref 152–353)

## 2021-08-01 PROCEDURE — 85008 BL SMEAR W/O DIFF WBC COUNT: CPT

## 2021-08-01 PROCEDURE — 85027 COMPLETE CBC AUTOMATED: CPT

## 2022-01-25 ENCOUNTER — HOSPITAL ENCOUNTER (OUTPATIENT)
Dept: HOSPITAL 67 - LAB | Age: 77
End: 2022-01-25
Attending: NURSE PRACTITIONER
Payer: COMMERCIAL

## 2022-01-25 DIAGNOSIS — D64.89: Primary | ICD-10-CM

## 2022-01-25 DIAGNOSIS — E55.9: ICD-10-CM

## 2022-01-25 DIAGNOSIS — E61.1: ICD-10-CM

## 2022-01-25 DIAGNOSIS — M19.90: ICD-10-CM

## 2022-01-25 DIAGNOSIS — R53.81: ICD-10-CM

## 2022-01-25 DIAGNOSIS — E78.49: ICD-10-CM

## 2022-01-25 DIAGNOSIS — E03.8: ICD-10-CM

## 2022-01-25 DIAGNOSIS — I10: ICD-10-CM

## 2022-01-25 DIAGNOSIS — Z79.899: ICD-10-CM

## 2022-01-25 DIAGNOSIS — R53.83: ICD-10-CM

## 2022-01-25 LAB
LDLC SERPL-MCNC: 135 MG/DL (ref 0–?)
PLATELET # BLD: 281 K/UL (ref 152–353)
POTASSIUM SERPL-SCNC: 3.9 MMOL/L (ref 3.6–5.2)
SODIUM SERPL-SCNC: 143 MMOL/L (ref 136–145)

## 2022-01-25 PROCEDURE — 83540 ASSAY OF IRON: CPT

## 2022-01-25 PROCEDURE — 85027 COMPLETE CBC AUTOMATED: CPT

## 2022-01-25 PROCEDURE — 82306 VITAMIN D 25 HYDROXY: CPT

## 2022-01-25 PROCEDURE — 82728 ASSAY OF FERRITIN: CPT

## 2022-01-25 PROCEDURE — 82746 ASSAY OF FOLIC ACID SERUM: CPT

## 2022-01-25 PROCEDURE — 84439 ASSAY OF FREE THYROXINE: CPT

## 2022-01-25 PROCEDURE — 80053 COMPREHEN METABOLIC PANEL: CPT

## 2022-01-25 PROCEDURE — 82607 VITAMIN B-12: CPT

## 2022-01-25 PROCEDURE — 80061 LIPID PANEL: CPT

## 2022-01-25 PROCEDURE — 84443 ASSAY THYROID STIM HORMONE: CPT

## 2022-01-25 PROCEDURE — 83036 HEMOGLOBIN GLYCOSYLATED A1C: CPT

## 2022-06-14 ENCOUNTER — HOSPITAL ENCOUNTER (OUTPATIENT)
Dept: HOSPITAL 67 - LAB | Age: 77
Discharge: HOME | End: 2022-06-14
Attending: NURSE PRACTITIONER
Payer: COMMERCIAL

## 2022-06-14 DIAGNOSIS — Z79.899: ICD-10-CM

## 2022-06-14 DIAGNOSIS — E83.51: ICD-10-CM

## 2022-06-14 DIAGNOSIS — E83.42: ICD-10-CM

## 2022-06-14 DIAGNOSIS — E61.1: ICD-10-CM

## 2022-06-14 DIAGNOSIS — D64.89: Primary | ICD-10-CM

## 2022-06-14 DIAGNOSIS — R53.83: ICD-10-CM

## 2022-06-14 DIAGNOSIS — E78.49: ICD-10-CM

## 2022-06-14 DIAGNOSIS — I10: ICD-10-CM

## 2022-06-14 DIAGNOSIS — E55.9: ICD-10-CM

## 2022-06-14 DIAGNOSIS — E03.8: ICD-10-CM

## 2022-06-14 LAB
LDLC SERPL-MCNC: 106 MG/DL (ref 0–?)
PLATELET # BLD: 204 K/UL (ref 152–353)
POTASSIUM SERPL-SCNC: 4.6 MMOL/L (ref 3.6–5.2)
SODIUM SERPL-SCNC: 142 MMOL/L (ref 136–145)

## 2022-06-14 PROCEDURE — 82607 VITAMIN B-12: CPT

## 2022-06-14 PROCEDURE — 82728 ASSAY OF FERRITIN: CPT

## 2022-06-14 PROCEDURE — 84439 ASSAY OF FREE THYROXINE: CPT

## 2022-06-14 PROCEDURE — 82306 VITAMIN D 25 HYDROXY: CPT

## 2022-06-14 PROCEDURE — 80053 COMPREHEN METABOLIC PANEL: CPT

## 2022-06-14 PROCEDURE — 83540 ASSAY OF IRON: CPT

## 2022-06-14 PROCEDURE — 83036 HEMOGLOBIN GLYCOSYLATED A1C: CPT

## 2022-06-14 PROCEDURE — 85027 COMPLETE CBC AUTOMATED: CPT

## 2022-06-14 PROCEDURE — 84443 ASSAY THYROID STIM HORMONE: CPT

## 2022-06-14 PROCEDURE — 80061 LIPID PANEL: CPT

## 2022-11-09 ENCOUNTER — HOSPITAL ENCOUNTER (OUTPATIENT)
Dept: HOSPITAL 67 - LAB | Age: 77
Discharge: HOME | End: 2022-11-09
Attending: NURSE PRACTITIONER
Payer: COMMERCIAL

## 2022-11-09 DIAGNOSIS — I10: ICD-10-CM

## 2022-11-09 DIAGNOSIS — F32.9: ICD-10-CM

## 2022-11-09 DIAGNOSIS — E61.1: ICD-10-CM

## 2022-11-09 DIAGNOSIS — R53.83: ICD-10-CM

## 2022-11-09 DIAGNOSIS — E55.9: ICD-10-CM

## 2022-11-09 DIAGNOSIS — M19.90: ICD-10-CM

## 2022-11-09 DIAGNOSIS — D64.89: ICD-10-CM

## 2022-11-09 DIAGNOSIS — G89.29: ICD-10-CM

## 2022-11-09 DIAGNOSIS — E03.8: Primary | ICD-10-CM

## 2022-11-09 DIAGNOSIS — E11.9: ICD-10-CM

## 2022-11-09 DIAGNOSIS — M32.8: ICD-10-CM

## 2022-11-09 DIAGNOSIS — E78.49: ICD-10-CM

## 2022-11-09 DIAGNOSIS — M06.9: ICD-10-CM

## 2022-11-09 LAB
LDLC SERPL-MCNC: 128 MG/DL (ref 0–?)
PLATELET # BLD: 253 K/UL (ref 152–353)
POTASSIUM SERPL-SCNC: 4.2 MMOL/L (ref 3.6–5.2)
SODIUM SERPL-SCNC: 139 MMOL/L (ref 136–145)

## 2022-11-09 PROCEDURE — 82607 VITAMIN B-12: CPT

## 2022-11-09 PROCEDURE — 84443 ASSAY THYROID STIM HORMONE: CPT

## 2022-11-09 PROCEDURE — 85027 COMPLETE CBC AUTOMATED: CPT

## 2022-11-09 PROCEDURE — 80053 COMPREHEN METABOLIC PANEL: CPT

## 2022-11-09 PROCEDURE — 82746 ASSAY OF FOLIC ACID SERUM: CPT

## 2022-11-09 PROCEDURE — 82728 ASSAY OF FERRITIN: CPT

## 2022-11-09 PROCEDURE — 83036 HEMOGLOBIN GLYCOSYLATED A1C: CPT

## 2022-11-09 PROCEDURE — 83540 ASSAY OF IRON: CPT

## 2022-11-09 PROCEDURE — 82306 VITAMIN D 25 HYDROXY: CPT

## 2022-11-09 PROCEDURE — 84439 ASSAY OF FREE THYROXINE: CPT

## 2022-11-09 PROCEDURE — 80061 LIPID PANEL: CPT

## 2023-02-09 ENCOUNTER — HOSPITAL ENCOUNTER (OUTPATIENT)
Dept: HOSPITAL 67 - RAD | Age: 78
Discharge: HOME | End: 2023-02-09
Attending: FAMILY MEDICINE
Payer: COMMERCIAL

## 2023-02-09 DIAGNOSIS — R07.81: ICD-10-CM

## 2023-02-09 DIAGNOSIS — M79.671: Primary | ICD-10-CM
